# Patient Record
Sex: FEMALE | Race: WHITE | URBAN - METROPOLITAN AREA
[De-identification: names, ages, dates, MRNs, and addresses within clinical notes are randomized per-mention and may not be internally consistent; named-entity substitution may affect disease eponyms.]

---

## 2022-08-17 ENCOUNTER — WEB ENCOUNTER (OUTPATIENT)
Dept: URBAN - METROPOLITAN AREA CLINIC 98 | Facility: CLINIC | Age: 42
End: 2022-08-17

## 2022-08-17 ENCOUNTER — OFFICE VISIT (OUTPATIENT)
Dept: URBAN - METROPOLITAN AREA CLINIC 98 | Facility: CLINIC | Age: 42
End: 2022-08-17
Payer: COMMERCIAL

## 2022-08-17 VITALS
HEIGHT: 68 IN | HEART RATE: 84 BPM | TEMPERATURE: 97.2 F | DIASTOLIC BLOOD PRESSURE: 78 MMHG | SYSTOLIC BLOOD PRESSURE: 152 MMHG | BODY MASS INDEX: 25.43 KG/M2 | WEIGHT: 167.8 LBS

## 2022-08-17 DIAGNOSIS — K21.9 GERD WITHOUT ESOPHAGITIS: ICD-10-CM

## 2022-08-17 DIAGNOSIS — R05.3 CHRONIC COUGH: ICD-10-CM

## 2022-08-17 PROCEDURE — 99202 OFFICE O/P NEW SF 15 MIN: CPT | Performed by: INTERNAL MEDICINE

## 2022-08-17 RX ORDER — PANTOPRAZOLE SODIUM 40 MG/1
1 TABLET TABLET, DELAYED RELEASE ORAL
Qty: 30 | Refills: 3 | OUTPATIENT
Start: 2022-08-17

## 2022-08-17 RX ORDER — AMOXICILLIN 500 MG/1
TAKE 2 TABLETS (1,000 MG) BY ORAL ROUTE 2 TIMES PER DAY IN COMBINATION WITH LANSOPRAZOLE AND CLARITHROMYCIN FOR 14 DAYS TABLET, FILM COATED ORAL 2
Qty: 56 | Refills: 0 | COMMUNITY
Start: 2017-10-17

## 2022-08-17 RX ORDER — METRONIDAZOLE 500 MG/1
TAKE 1 TABLET (500 MG) BY ORAL ROUTE 2 TIMES PER DAY FOR 14 DAYS TABLET, FILM COATED ORAL 2
Qty: 28 | Refills: 0 | COMMUNITY
Start: 2017-10-17

## 2022-08-17 RX ORDER — CLARITHROMYCIN 500 MG/1
TAKE 1 TABLET (500 MG) BY ORAL ROUTE 2 TIMES PER DAY FOR 14 DAYS TABLET, FILM COATED ORAL 2
Qty: 28 | Refills: 0 | COMMUNITY
Start: 2017-10-17

## 2022-08-17 RX ORDER — OMEPRAZOLE 40 MG/1
TAKE 1 CAPSULE (40 MG) BY ORAL ROUTE ONCE DAILY BEFORE A MEAL IN COMBINATION WITH CLARITHROMYCIN FOR 14 DAYS CAPSULE, DELAYED RELEASE PELLETS ORAL 1
Qty: 14 | Refills: 0 | COMMUNITY
Start: 2017-10-17

## 2022-08-17 NOTE — HPI-TODAY'S VISIT:
43 yo pt  w chronic GERD, w increase frequency of ENA for past 3 mos. She has been experiencing p-prandial nausea wo emesis, subxiphoid pressure, w increasing heartburn, regurgitation and frequent nocturnal ENA. Her subxiphoid pain is exacerbated by deep inspiration. She has no cardiorespiratory sxs x for occasional dry cough. No dysphagia / odynophagia. Has been on no Rx. Takes prn Naproxen for LBP. No anorexia  or weight loss. Normal bm's wo melenic stools and no hematochezia. Had mild COVID-19 12/21 and has received COVID-19 vaccine + booster. No other complaints. Normal labs 2/22.

## 2022-08-18 ENCOUNTER — TELEPHONE ENCOUNTER (OUTPATIENT)
Dept: URBAN - METROPOLITAN AREA CLINIC 92 | Facility: CLINIC | Age: 42
End: 2022-08-18

## 2022-08-18 ENCOUNTER — OFFICE VISIT (OUTPATIENT)
Dept: URBAN - METROPOLITAN AREA SURGERY CENTER 18 | Facility: SURGERY CENTER | Age: 42
End: 2022-08-18
Payer: COMMERCIAL

## 2022-08-18 ENCOUNTER — CLAIMS CREATED FROM THE CLAIM WINDOW (OUTPATIENT)
Dept: URBAN - METROPOLITAN AREA CLINIC 4 | Facility: CLINIC | Age: 42
End: 2022-08-18
Payer: COMMERCIAL

## 2022-08-18 DIAGNOSIS — K20.0 EOSINOPHILIC ESOPHAGITIS: ICD-10-CM

## 2022-08-18 DIAGNOSIS — K31.89 ACQUIRED DEFORMITY OF DUODENUM: ICD-10-CM

## 2022-08-18 DIAGNOSIS — K20.0 ALLERGIC EOSINOPHILIC ESOPHAGITIS: ICD-10-CM

## 2022-08-18 DIAGNOSIS — K31.89 OTHER DISEASES OF STOMACH AND DUODENUM: ICD-10-CM

## 2022-08-18 PROCEDURE — 43239 EGD BIOPSY SINGLE/MULTIPLE: CPT | Performed by: INTERNAL MEDICINE

## 2022-08-18 PROCEDURE — 88305 TISSUE EXAM BY PATHOLOGIST: CPT | Performed by: PATHOLOGY

## 2022-08-18 PROCEDURE — 88312 SPECIAL STAINS GROUP 1: CPT | Performed by: PATHOLOGY

## 2022-08-18 PROCEDURE — G8907 PT DOC NO EVENTS ON DISCHARG: HCPCS | Performed by: INTERNAL MEDICINE

## 2022-08-18 RX ORDER — HYOSCYAMINE SULFATE 0.12 MG/1
1 TABLET ON THE TONGUE AND ALLOW TO DISSOLVE  AS NEEDED TABLET, CHEWABLE ORAL EVERY 6 HOURS
Qty: 30 | Refills: 1 | OUTPATIENT
Start: 2022-08-18 | End: 2022-09-01

## 2022-08-18 RX ORDER — AMOXICILLIN 500 MG/1
TAKE 2 TABLETS (1,000 MG) BY ORAL ROUTE 2 TIMES PER DAY IN COMBINATION WITH LANSOPRAZOLE AND CLARITHROMYCIN FOR 14 DAYS TABLET, FILM COATED ORAL 2
Qty: 56 | Refills: 0 | COMMUNITY
Start: 2017-10-17

## 2022-08-18 RX ORDER — OMEPRAZOLE 40 MG/1
TAKE 1 CAPSULE (40 MG) BY ORAL ROUTE ONCE DAILY BEFORE A MEAL IN COMBINATION WITH CLARITHROMYCIN FOR 14 DAYS CAPSULE, DELAYED RELEASE PELLETS ORAL 1
Qty: 14 | Refills: 0 | COMMUNITY
Start: 2017-10-17

## 2022-08-18 RX ORDER — CLARITHROMYCIN 500 MG/1
TAKE 1 TABLET (500 MG) BY ORAL ROUTE 2 TIMES PER DAY FOR 14 DAYS TABLET, FILM COATED ORAL 2
Qty: 28 | Refills: 0 | COMMUNITY
Start: 2017-10-17

## 2022-08-18 RX ORDER — METRONIDAZOLE 500 MG/1
TAKE 1 TABLET (500 MG) BY ORAL ROUTE 2 TIMES PER DAY FOR 14 DAYS TABLET, FILM COATED ORAL 2
Qty: 28 | Refills: 0 | COMMUNITY
Start: 2017-10-17

## 2022-08-18 RX ORDER — PANTOPRAZOLE SODIUM 40 MG/1
1 TABLET TABLET, DELAYED RELEASE ORAL
Qty: 30 | Refills: 3 | Status: ACTIVE | COMMUNITY
Start: 2022-08-17

## 2022-08-22 PROBLEM — 266435005: Status: ACTIVE | Noted: 2022-08-17

## 2022-09-02 ENCOUNTER — OFFICE VISIT (OUTPATIENT)
Dept: URBAN - METROPOLITAN AREA TELEHEALTH 2 | Facility: TELEHEALTH | Age: 42
End: 2022-09-02
Payer: COMMERCIAL

## 2022-09-02 DIAGNOSIS — K21.00 GASTROESOPHAGEAL REFLUX DISEASE WITH ESOPHAGITIS WITHOUT HEMORRHAGE: ICD-10-CM

## 2022-09-02 PROCEDURE — 99214 OFFICE O/P EST MOD 30 MIN: CPT | Performed by: INTERNAL MEDICINE

## 2022-09-02 RX ORDER — CLARITHROMYCIN 500 MG/1
TAKE 1 TABLET (500 MG) BY ORAL ROUTE 2 TIMES PER DAY FOR 14 DAYS TABLET, FILM COATED ORAL 2
Qty: 28 | Refills: 0 | COMMUNITY
Start: 2017-10-17

## 2022-09-02 RX ORDER — OMEPRAZOLE 40 MG/1
TAKE 1 CAPSULE (40 MG) BY ORAL ROUTE ONCE DAILY BEFORE A MEAL IN COMBINATION WITH CLARITHROMYCIN FOR 14 DAYS CAPSULE, DELAYED RELEASE PELLETS ORAL 1
Qty: 14 | Refills: 0 | COMMUNITY
Start: 2017-10-17

## 2022-09-02 RX ORDER — AMOXICILLIN 500 MG/1
TAKE 2 TABLETS (1,000 MG) BY ORAL ROUTE 2 TIMES PER DAY IN COMBINATION WITH LANSOPRAZOLE AND CLARITHROMYCIN FOR 14 DAYS TABLET, FILM COATED ORAL 2
Qty: 56 | Refills: 0 | COMMUNITY
Start: 2017-10-17

## 2022-09-02 RX ORDER — PANTOPRAZOLE SODIUM 40 MG/1
1 TABLET TABLET, DELAYED RELEASE ORAL
Qty: 30 | Refills: 3 | Status: ACTIVE | COMMUNITY
Start: 2022-08-17

## 2022-09-02 RX ORDER — METRONIDAZOLE 500 MG/1
TAKE 1 TABLET (500 MG) BY ORAL ROUTE 2 TIMES PER DAY FOR 14 DAYS TABLET, FILM COATED ORAL 2
Qty: 28 | Refills: 0 | COMMUNITY
Start: 2017-10-17

## 2022-09-02 NOTE — HPI-TODAY'S VISIT:
This is a Telehealth OV to which patient  has agreed to. Limitations of telehealth discussed. Patient understands and agrees to proceed.  Patient's @ home during this virtual OV. 43 yo pt  w chronic GERD here for follow up. She has been feeling better w elimination diet, 5 small meals a day and on PPI ac dinner. She has had less NEA sxs and no dysphagia.  Muc less subxiohoid pressure and less intense.  No nocturnal ENA sxs. EGD 8/22: irregular Z line, sm HH and bile reflux gastritis. Path still pending. She has no cardiorespiratory sxs. No dysphagia / odynophagia.  Takes prn Naproxen for LBP. No anorexia  or weight loss. Normal bm's wo melenic stools and no hematochezia. Had mild COVID-19 12/21 and has received COVID-19 vaccine + booster. No other complaints. Normal labs 2/22.

## 2022-09-03 ENCOUNTER — DASHBOARD ENCOUNTERS (OUTPATIENT)
Age: 42
End: 2022-09-03

## 2022-09-03 PROBLEM — 266433003: Status: ACTIVE | Noted: 2022-09-03

## 2024-09-25 ENCOUNTER — OFFICE VISIT (OUTPATIENT)
Dept: URBAN - METROPOLITAN AREA CLINIC 98 | Facility: CLINIC | Age: 44
End: 2024-09-25

## 2024-11-20 ENCOUNTER — OFFICE VISIT (OUTPATIENT)
Dept: URBAN - METROPOLITAN AREA CLINIC 98 | Facility: CLINIC | Age: 44
End: 2024-11-20
Payer: COMMERCIAL

## 2024-11-20 ENCOUNTER — TELEPHONE ENCOUNTER (OUTPATIENT)
Dept: URBAN - METROPOLITAN AREA CLINIC 98 | Facility: CLINIC | Age: 44
End: 2024-11-20

## 2024-11-20 ENCOUNTER — LAB OUTSIDE AN ENCOUNTER (OUTPATIENT)
Dept: URBAN - METROPOLITAN AREA CLINIC 98 | Facility: CLINIC | Age: 44
End: 2024-11-20

## 2024-11-20 VITALS
WEIGHT: 172.8 LBS | SYSTOLIC BLOOD PRESSURE: 112 MMHG | DIASTOLIC BLOOD PRESSURE: 60 MMHG | TEMPERATURE: 97 F | HEART RATE: 76 BPM | BODY MASS INDEX: 26.19 KG/M2 | HEIGHT: 68 IN

## 2024-11-20 DIAGNOSIS — Z12.11 COLON CANCER SCREENING: ICD-10-CM

## 2024-11-20 DIAGNOSIS — K20.0 EOSINOPHILIC ESOPHAGITIS: ICD-10-CM

## 2024-11-20 DIAGNOSIS — K21.9 CHRONIC GERD: ICD-10-CM

## 2024-11-20 DIAGNOSIS — R10.11 RUQ ABDOMINAL PAIN: ICD-10-CM

## 2024-11-20 PROCEDURE — 99214 OFFICE O/P EST MOD 30 MIN: CPT | Performed by: INTERNAL MEDICINE

## 2024-11-20 RX ORDER — PANTOPRAZOLE SODIUM 40 MG/1
1 TABLET TABLET, DELAYED RELEASE ORAL ONCE A DAY
Qty: 30 | Refills: 3 | OUTPATIENT
Start: 2024-11-20

## 2024-11-20 RX ORDER — SODIUM, POTASSIUM,MAG SULFATES 17.5-3.13G
354ML SOLUTION, RECONSTITUTED, ORAL ORAL
Qty: 345 MILLILITER | Refills: 0 | OUTPATIENT
Start: 2024-11-20 | End: 2024-11-21

## 2024-11-20 RX ORDER — CALCIUM CARBONATE 500 MG/1
1 TABLET TABLET ORAL ONCE A DAY
Status: ACTIVE | COMMUNITY
Start: 2024-11-22

## 2024-11-20 RX ORDER — OMEPRAZOLE 40 MG/1
TAKE 1 CAPSULE (40 MG) BY ORAL ROUTE ONCE DAILY BEFORE A MEAL IN COMBINATION WITH CLARITHROMYCIN FOR 14 DAYS CAPSULE, DELAYED RELEASE PELLETS ORAL 1
Qty: 14 | Refills: 0 | COMMUNITY
Start: 2017-10-17

## 2024-11-20 RX ORDER — CLARITHROMYCIN 500 MG/1
TAKE 1 TABLET (500 MG) BY ORAL ROUTE 2 TIMES PER DAY FOR 14 DAYS TABLET, FILM COATED ORAL 2
Qty: 28 | Refills: 0 | COMMUNITY
Start: 2017-10-17

## 2024-11-20 RX ORDER — AMOXICILLIN 500 MG/1
TAKE 2 TABLETS (1,000 MG) BY ORAL ROUTE 2 TIMES PER DAY IN COMBINATION WITH LANSOPRAZOLE AND CLARITHROMYCIN FOR 14 DAYS TABLET, FILM COATED ORAL 2
Qty: 56 | Refills: 0 | COMMUNITY
Start: 2017-10-17

## 2024-11-20 RX ORDER — METRONIDAZOLE 500 MG/1
TAKE 1 TABLET (500 MG) BY ORAL ROUTE 2 TIMES PER DAY FOR 14 DAYS TABLET, FILM COATED ORAL 2
Qty: 28 | Refills: 0 | COMMUNITY
Start: 2017-10-17

## 2024-11-20 RX ORDER — HYOSCYAMINE SULFATE 0.12 MG/1
1 TABLET UNDER THE TONGUE AND ALLOW TO DISSOLVE AS NEEDED TABLET SUBLINGUAL TWICE A DAY
Qty: 60 TABLET | Refills: 3 | OUTPATIENT
Start: 2024-11-20 | End: 2025-03-20

## 2024-11-20 NOTE — HPI-TODAY'S VISIT:
45 yo pt  w chronic GERD here for follow up. She has been experiencing increasing episodes of ENA, w retrosternal burning discomfort, nausea, heartburn wo dysphagia / odynophagia. Has been taking TUMS and alexa tablets. She reports lower abdominal discomfort and intermittent RUQ pain wo radiation. Normal, formed bm's wo bleeding. No anorexia or weight loss. Has been on a healthy diet and exercising on a regular basis. No nocturnal ENA sxs. EGD 8/22: irregular Z line, NERD, EoE (90), sm HH and bile reflux Hp-negative gastritis and normal duodenal bx's. She has no cardiorespiratory sxs. No other complaints.

## 2024-11-21 ENCOUNTER — TELEPHONE ENCOUNTER (OUTPATIENT)
Dept: URBAN - METROPOLITAN AREA CLINIC 98 | Facility: CLINIC | Age: 44
End: 2024-11-21

## 2024-11-22 PROBLEM — 235595009: Status: ACTIVE | Noted: 2024-11-20

## 2024-11-22 PROBLEM — 235599003: Status: ACTIVE | Noted: 2024-11-20

## 2025-01-29 ENCOUNTER — LAB OUTSIDE AN ENCOUNTER (OUTPATIENT)
Dept: URBAN - METROPOLITAN AREA CLINIC 98 | Facility: CLINIC | Age: 45
End: 2025-01-29

## 2025-02-06 ENCOUNTER — OFFICE VISIT (OUTPATIENT)
Dept: URBAN - METROPOLITAN AREA SURGERY CENTER 18 | Facility: SURGERY CENTER | Age: 45
End: 2025-02-06
Payer: COMMERCIAL

## 2025-02-06 ENCOUNTER — CLAIMS CREATED FROM THE CLAIM WINDOW (OUTPATIENT)
Dept: URBAN - METROPOLITAN AREA CLINIC 4 | Facility: CLINIC | Age: 45
End: 2025-02-06
Payer: COMMERCIAL

## 2025-02-06 DIAGNOSIS — K31.89 ACHYLIA: ICD-10-CM

## 2025-02-06 DIAGNOSIS — K22.89 COLUMNAR EPITHELIAL-LINED LOWER ESOPHAGUS: ICD-10-CM

## 2025-02-06 DIAGNOSIS — K22.89 OTHER SPECIFIED DISEASE OF ESOPHAGUS: ICD-10-CM

## 2025-02-06 DIAGNOSIS — K31.89 OTHER DISEASES OF STOMACH AND DUODENUM: ICD-10-CM

## 2025-02-06 DIAGNOSIS — Z12.11 COLON CANCER SCREENING: ICD-10-CM

## 2025-02-06 DIAGNOSIS — K29.70 GASTRITIS, UNSPECIFIED, WITHOUT BLEEDING: ICD-10-CM

## 2025-02-06 DIAGNOSIS — K29.70 GASTRITIS: ICD-10-CM

## 2025-02-06 DIAGNOSIS — K29.60 ADENOPAPILLOMATOSIS GASTRICA: ICD-10-CM

## 2025-02-06 DIAGNOSIS — R10.13 ABDOMINAL DISCOMFORT, EPIGASTRIC: ICD-10-CM

## 2025-02-06 PROCEDURE — 88341 IMHCHEM/IMCYTCHM EA ADD ANTB: CPT | Performed by: PATHOLOGY

## 2025-02-06 PROCEDURE — G0121 COLON CA SCRN NOT HI RSK IND: HCPCS | Performed by: INTERNAL MEDICINE

## 2025-02-06 PROCEDURE — 88312 SPECIAL STAINS GROUP 1: CPT | Performed by: PATHOLOGY

## 2025-02-06 PROCEDURE — 0528F RCMND FLW-UP 10 YRS DOCD: CPT | Performed by: INTERNAL MEDICINE

## 2025-02-06 PROCEDURE — 00813 ANES UPR LWR GI NDSC PX: CPT | Performed by: NURSE ANESTHETIST, CERTIFIED REGISTERED

## 2025-02-06 PROCEDURE — 88342 IMHCHEM/IMCYTCHM 1ST ANTB: CPT | Performed by: PATHOLOGY

## 2025-02-06 PROCEDURE — 88305 TISSUE EXAM BY PATHOLOGIST: CPT | Performed by: PATHOLOGY

## 2025-02-06 PROCEDURE — 43239 EGD BIOPSY SINGLE/MULTIPLE: CPT | Performed by: INTERNAL MEDICINE

## 2025-02-18 ENCOUNTER — OFFICE VISIT (OUTPATIENT)
Dept: URBAN - METROPOLITAN AREA TELEHEALTH 2 | Facility: TELEHEALTH | Age: 45
End: 2025-02-18
Payer: COMMERCIAL

## 2025-02-18 DIAGNOSIS — K76.0 METABOLIC DYSFUNCTION-ASSOCIATED FATTY LIVER DISEASE (MAFLD): ICD-10-CM

## 2025-02-18 DIAGNOSIS — Z12.11 COLON CANCER SCREENING: ICD-10-CM

## 2025-02-18 DIAGNOSIS — K21.9 CHRONIC GERD: ICD-10-CM

## 2025-02-18 DIAGNOSIS — K20.0 EOSINOPHILIC ESOPHAGITIS: ICD-10-CM

## 2025-02-18 DIAGNOSIS — R10.11 RUQ ABDOMINAL PAIN: ICD-10-CM

## 2025-02-18 PROCEDURE — 99214 OFFICE O/P EST MOD 30 MIN: CPT | Performed by: INTERNAL MEDICINE

## 2025-02-18 RX ORDER — CLARITHROMYCIN 500 MG/1
TAKE 1 TABLET (500 MG) BY ORAL ROUTE 2 TIMES PER DAY FOR 14 DAYS TABLET, FILM COATED ORAL 2
Qty: 28 | Refills: 0 | COMMUNITY
Start: 2017-10-17

## 2025-02-18 RX ORDER — OMEPRAZOLE 20 MG/1
1 CAPSULE 1/2 TO 1 HOUR BEFORE MORNING MEAL CAPSULE, DELAYED RELEASE ORAL ONCE A DAY
Qty: 30 | Refills: 3 | OUTPATIENT
Start: 2025-02-24

## 2025-02-18 RX ORDER — HYOSCYAMINE SULFATE 0.12 MG/1
1 TABLET UNDER THE TONGUE AND ALLOW TO DISSOLVE AS NEEDED TABLET SUBLINGUAL TWICE A DAY
Qty: 60 TABLET | Refills: 3 | OUTPATIENT

## 2025-02-18 RX ORDER — HYOSCYAMINE SULFATE 0.12 MG/1
1 TABLET UNDER THE TONGUE AND ALLOW TO DISSOLVE AS NEEDED TABLET SUBLINGUAL TWICE A DAY
Qty: 60 TABLET | Refills: 3 | Status: ACTIVE | COMMUNITY
Start: 2024-11-20 | End: 2025-03-20

## 2025-02-18 RX ORDER — OMEPRAZOLE 20 MG/1
1 CAPSULE 1/2 TO 1 HOUR BEFORE MORNING MEAL CAPSULE, DELAYED RELEASE ORAL ONCE A DAY
Status: ACTIVE | COMMUNITY
Start: 2025-02-24

## 2025-02-18 RX ORDER — AMOXICILLIN 500 MG/1
TAKE 2 TABLETS (1,000 MG) BY ORAL ROUTE 2 TIMES PER DAY IN COMBINATION WITH LANSOPRAZOLE AND CLARITHROMYCIN FOR 14 DAYS TABLET, FILM COATED ORAL 2
Qty: 56 | Refills: 0 | COMMUNITY
Start: 2017-10-17

## 2025-02-18 RX ORDER — CALCIUM CARBONATE 500 MG/1
1 TABLET TABLET ORAL ONCE A DAY
Status: ACTIVE | COMMUNITY
Start: 2024-11-22

## 2025-02-18 RX ORDER — OMEPRAZOLE 40 MG/1
TAKE 1 CAPSULE (40 MG) BY ORAL ROUTE ONCE DAILY BEFORE A MEAL IN COMBINATION WITH CLARITHROMYCIN FOR 14 DAYS CAPSULE, DELAYED RELEASE PELLETS ORAL 1
Qty: 14 | Refills: 0 | COMMUNITY
Start: 2017-10-17

## 2025-02-18 RX ORDER — METRONIDAZOLE 500 MG/1
TAKE 1 TABLET (500 MG) BY ORAL ROUTE 2 TIMES PER DAY FOR 14 DAYS TABLET, FILM COATED ORAL 2
Qty: 28 | Refills: 0 | COMMUNITY
Start: 2017-10-17

## 2025-02-18 NOTE — HPI-TODAY'S VISIT:
This is a Telehealth OV to which patient has agreed to. Limitations of TH discussed; patient understands  and agrees to proceed. Pt's at home during this virtual OV. 45 yo pt  w chronic GERD here for follow up.Overall, doing well. Still w intermittent R-sided p-prandial discomfort, after ingestion of all type of foods. Has no N / V / HB nor dysphagia or odynophagia w this sxs. Has been on Omeprazole 20 mg po qd. EGD 2/25: NERD, no EoE, sm HH, Hp-negative chronic gastritis and normal duodenal bx's. Labs 12/24: normal CBC, CMP, F0 and normal lipase. RUQ-US 1/25: diffuse fatty liver w no parenchymal lesions. HIDA-CCK normal. Colonoscopy 2/25: Normal w sigmoid spasm. Normal, formed bm's wo bleeding. No anorexia or weight loss. Has been on a healthy diet and exercising on a regular basis. No nocturnal ENA sxs She has no cardiorespiratory sxs. No other complaints.

## 2025-02-27 ENCOUNTER — TELEPHONE ENCOUNTER (OUTPATIENT)
Dept: URBAN - METROPOLITAN AREA CLINIC 98 | Facility: CLINIC | Age: 45
End: 2025-02-27